# Patient Record
Sex: FEMALE
[De-identification: names, ages, dates, MRNs, and addresses within clinical notes are randomized per-mention and may not be internally consistent; named-entity substitution may affect disease eponyms.]

---

## 2018-01-25 ENCOUNTER — HOSPITAL ENCOUNTER (EMERGENCY)
Dept: HOSPITAL 5 - ED | Age: 33
LOS: 1 days | Discharge: HOME | End: 2018-01-26
Payer: MEDICAID

## 2018-01-25 DIAGNOSIS — Z3A.10: ICD-10-CM

## 2018-01-25 DIAGNOSIS — Z87.891: ICD-10-CM

## 2018-01-25 DIAGNOSIS — O03.4: Primary | ICD-10-CM

## 2018-01-25 LAB
BAND NEUTROPHILS # (MANUAL): 0.2 K/MM3
HCT VFR BLD CALC: 40.1 % (ref 30.3–42.9)
HGB BLD-MCNC: 12.9 GM/DL (ref 10.1–14.3)
MCH RBC QN AUTO: 26 PG (ref 28–32)
MCHC RBC AUTO-ENTMCNC: 32 % (ref 30–34)
MCV RBC AUTO: 79 FL (ref 79–97)
MYELOCYTES # (MANUAL): 0 K/MM3
PLATELET # BLD: 344 K/MM3 (ref 140–440)
PROMYELOCYTES # (MANUAL): 0 K/MM3
RBC # BLD AUTO: 5.07 M/MM3 (ref 3.65–5.03)
TOTAL CELLS COUNTED BLD: 100

## 2018-01-25 PROCEDURE — 76817 TRANSVAGINAL US OBSTETRIC: CPT

## 2018-01-25 PROCEDURE — 86900 BLOOD TYPING SEROLOGIC ABO: CPT

## 2018-01-25 PROCEDURE — 36415 COLL VENOUS BLD VENIPUNCTURE: CPT

## 2018-01-25 PROCEDURE — 96372 THER/PROPH/DIAG INJ SC/IM: CPT

## 2018-01-25 PROCEDURE — 99284 EMERGENCY DEPT VISIT MOD MDM: CPT

## 2018-01-25 PROCEDURE — 86850 RBC ANTIBODY SCREEN: CPT

## 2018-01-25 PROCEDURE — 85025 COMPLETE CBC W/AUTO DIFF WBC: CPT

## 2018-01-25 PROCEDURE — 84702 CHORIONIC GONADOTROPIN TEST: CPT

## 2018-01-25 PROCEDURE — 85007 BL SMEAR W/DIFF WBC COUNT: CPT

## 2018-01-25 PROCEDURE — 76801 OB US < 14 WKS SINGLE FETUS: CPT

## 2018-01-25 PROCEDURE — 86901 BLOOD TYPING SEROLOGIC RH(D): CPT

## 2018-01-25 NOTE — ULTRASOUND REPORT
FINAL REPORT



PROCEDURE:  US OB TRANSVAGINAL and transabdominal 



TECHNIQUE:  Real-time transabdominal and transvaginal sonography

of the uterus, placenta, amniotic fluid, adnexa, and fetus was

performed with image documentation. Measurements were obtained to

determine fetal age/size. M-mode Doppler was used to document

fetal heartbeat. CPT 68376 and 81738 



HISTORY:  vag bleeding and cramping 



COMPARISON:  No prior studies are available for comparison.



FINDINGS:  

Uterus measures 10.4 x 3.7 x 4.3 centimeters. 



There is a cystic ovoid structure which extends from the lower

uterine segment into the endocervical canal, which may be a

gestational sac. No fetal pole or yolk sac is seen. Mean sac

diameter measures 17.9 millimeters, which would correlate to a

gestational age of 6 weeks 5 days. 



The endometrium is irregular and heterogeneous, with complex

fluid present in the endometrial cavity. 



Right Ovary: 1.8 centimeter cyst is present



Left Ovary: Normal.



IMPRESSION:  

Anechoic structure in the lower uterine segment and endocervical

canal, which has the appearance of a gestational sac. No fetal

pole or yolk sac is seen. 



Small amount of complex fluid is seen in the endometrial cavity. 



Recommend clinical and sonographic follow-up. 



PROCEDURE:  



TECHNIQUE:  



HISTORY:  



COMPARISON:  



FINDINGS:  



IMPRESSION:

## 2018-01-25 NOTE — ULTRASOUND REPORT
FINAL REPORT



PROCEDURE:  US OB TRANSVAGINAL and transabdominal 



TECHNIQUE:  Real-time transabdominal and transvaginal sonography

of the uterus, placenta, amniotic fluid, adnexa, and fetus was

performed with image documentation. Measurements were obtained to

determine fetal age/size. M-mode Doppler was used to document

fetal heartbeat. CPT 37088 and 38410 



HISTORY:  vag bleeding and cramping 



COMPARISON:  No prior studies are available for comparison.



FINDINGS:  

Uterus measures 10.4 x 3.7 x 4.3 centimeters. 



There is a cystic ovoid structure which extends from the lower

uterine segment into the endocervical canal, which may be a

gestational sac. No fetal pole or yolk sac is seen. Mean sac

diameter measures 17.9 millimeters, which would correlate to a

gestational age of 6 weeks 5 days. 



The endometrium is irregular and heterogeneous, with complex

fluid present in the endometrial cavity. 



Right Ovary: 1.8 centimeter cyst is present



Left Ovary: Normal.



IMPRESSION:  

Anechoic structure in the lower uterine segment and endocervical

canal, which has the appearance of a gestational sac. No fetal

pole or yolk sac is seen. 



Small amount of complex fluid is seen in the endometrial cavity. 



Recommend clinical and sonographic follow-up.

## 2018-01-26 VITALS — SYSTOLIC BLOOD PRESSURE: 128 MMHG | DIASTOLIC BLOOD PRESSURE: 74 MMHG

## 2018-01-26 NOTE — EMERGENCY DEPARTMENT REPORT
ED Pregnancy HPI





- General


Chief complaint: Vaginal Bleeding


Stated complaint: VAG BLEEDING, PREG, VOMITING


Time Seen by Provider: 18 07:16


Source: patient


Mode of arrival: Ambulatory


Limitations: No Limitations





- History of Present Illness


Initial comments: 


Barbara is a pleasant 32-year-old female  last menstrual period in October 

who presents with pelvic pain and a large amount of vaginal bleeding.  

Yesterday she developed vomiting.  Her recent outpatient ultrasound showed a 

gestational sac approximately 10 weeks estimated gestational age.  The fetal 

pole measured to be approximately 6 weeks.  She denies fever.  Denies cough.  

She denies any other symptoms.  








Social history: She has history of tobacco use and alcohol use.  Her boyfriend 

is at the bedside.





MD Complaint: vaginal bleeding


-: days(s) (1)


Location: pelvis


Radiation: none


Severity: severe


Severity scale (0 -10): 10


Quality: cramping


Consistency: constant


Associated symptoms: nausea/vomiting





- Related Data


 Previous Rx's











 Medication  Instructions  Recorded  Last Taken  Type


 


HYDROcodone/APAP 5-325 [[place] 1 each PO Q4HR PRN #10 tablet MDD 18 

Unknown Rx





5/325] 8 tabs/day   


 


Ibuprofen 800 mg PO Q6HR #15 tablet 18 Unknown Rx











 Allergies











Allergy/AdvReac Type Severity Reaction Status Date / Time


 


No Known Allergies Allergy   Verified 18 20:23














ED Review of Systems


ROS: 


Stated complaint: VAG BLEEDING, PREG, VOMITING


Other details as noted in HPI





Comment: All other systems reviewed and negative


Constitutional: denies: chills, fever, malaise


ENT: denies: ear pain


Respiratory: denies: cough


Cardiovascular: denies: chest pain


Gastrointestinal: nausea, vomiting.  denies: melena


Genitourinary: denies: urgency





ED Past Medical Hx





- Past Medical History


Previous Medical History?: No





- Surgical History


Past Surgical History?: No





- Social History


Smoking Status: Former Smoker


Substance Use Type: None





- Medications


Home Medications: 


 Home Medications











 Medication  Instructions  Recorded  Confirmed  Last Taken  Type


 


HYDROcodone/APAP 5-325 [[place] 1 each PO Q4HR PRN #10 tablet MDD 18  

Unknown Rx





5/325] 8 tabs/day    


 


Ibuprofen 800 mg PO Q6HR #15 tablet 18  Unknown Rx














ED Physical Exam





- General


Limitations: No Limitations


General appearance: alert, in no apparent distress





- Head


Head exam: Present: atraumatic, normocephalic





- Eye


Eye exam: Present: normal appearance





- ENT


ENT exam: Present: normal orophraynx, mucous membranes moist





- Neck


Neck exam: Present: normal inspection





- Respiratory


Respiratory exam: Present: normal lung sounds bilaterally.  Absent: respiratory 

distress, wheezes, rales, rhonchi





- Cardiovascular


Cardiovascular Exam: Present: regular rate, normal rhythm.  Absent: normal 

heart sounds, systolic murmur, diastolic murmur, rubs, gallop





- GI/Abdominal


GI/Abdominal exam: Present: soft, normal bowel sounds.  Absent: distended, 

tenderness, guarding, rebound





- 


External exam: Present: normal external exam.  Absent: erythema, swelling, 

lesions, lacerations, ecchymosis


Speculum exam: Present: vaginal bleeding, other (mild vaginal bleeding with 

open cervical os, active extrusion of products of conception)





- Extremities Exam


Extremities exam: Present: normal inspection





- Back Exam


Back exam: Present: normal inspection





- Neurological Exam


Neurological exam: Present: alert, oriented X3





- Psychiatric


Psychiatric exam: Present: normal affect, normal mood





- Skin


Skin exam: Present: warm, dry, intact, normal color.  Absent: rash





ED Course


 Vital Signs











  18





  19:31 19:40 02:00


 


Temperature 97.8 F  98.2 F


 


Pulse Rate 65  76


 


Respiratory 14 18 12





Rate   


 


Blood Pressure 122/77  110/77


 


O2 Sat by Pulse 100  100





Oximetry   














  18





  07:23


 


Temperature 


 


Pulse Rate 


 


Respiratory 20





Rate 


 


Blood Pressure 


 


O2 Sat by Pulse 100





Oximetry 














ED Medical Decision Making





- Lab Data


Result diagrams: 


 18 20:06











 Laboratory Results - last 24 hr











  18





  20:06 20:06 20:06


 


WBC  20.6 H  


 


RBC  5.07 H  


 


Hgb  12.9  


 


Hct  40.1  


 


MCV  79  


 


MCH  26 L  


 


MCHC  32  


 


RDW  14.3  


 


Plt Count  344  


 


Add Manual Diff  Complete  


 


Total Counted  100  


 


Seg Neuts % (Manual)  89.0 H  


 


Band Neutrophils %  1.0  


 


Lymphocytes % (Manual)  6.0 L  


 


Reactive Lymphs % (Man)  0  


 


Monocytes % (Manual)  3.0  


 


Eosinophils % (Manual)  0  


 


Basophils % (Manual)  0  


 


Metamyelocytes %  1.0  


 


Myelocytes %  0  


 


Promyelocytes %  0  


 


Blast Cells %  0  


 


Nucleated RBC %  Not Reportable  


 


Seg Neutrophils # Man  18.3 H  


 


Band Neutrophils #  0.2  


 


Lymphocytes # (Manual)  1.2  


 


Abs React Lymphs (Man)  0.0  


 


Monocytes # (Manual)  0.6  


 


Eosinophils # (Manual)  0.0  


 


Basophils # (Manual)  0.0  


 


Metamyelocytes #  0.2  


 


Myelocytes #  0.0  


 


Promyelocytes #  0.0  


 


Blast Cells #  0.0  


 


WBC Morphology  Not Reportable  


 


Hypersegmented Neuts  Not Reportable  


 


Hyposegmented Neuts  Not Reportable  


 


Hypogranular Neuts  Not Reportable  


 


Smudge Cells  Not Reportable  


 


Toxic Granulation  Not Reportable  


 


Toxic Vacuolation  Not Reportable  


 


Dohle Bodies  Not Reportable  


 


Pelger-Huet Anomaly  Not Reportable  


 


Elnea Rods  Not Reportable  


 


Platelet Estimate  Consistent w auto  


 


Clumped Platelets  Not Reportable  


 


Plt Clumps, EDTA  Not Reportable  


 


Large Platelets  Not Reportable  


 


Giant Platelets  Not Reportable  


 


Platelet Satelliting  Not Reportable  


 


Plt Morphology Comment  Not Reportable  


 


RBC Morphology  Not Reportable  


 


Dimorphic RBCs  Not Reportable  


 


Polychromasia  Not Reportable  


 


Hypochromasia  Not Reportable  


 


Poikilocytosis  Not Reportable  


 


Anisocytosis  Not Reportable  


 


Microcytosis  Not Reportable  


 


Macrocytosis  Not Reportable  


 


Spherocytes  Not Reportable  


 


Pappenheimer Bodies  Not Reportable  


 


Sickle Cells  Not Reportable  


 


Target Cells  Not Reportable  


 


Tear Drop Cells  Not Reportable  


 


Ovalocytes  Few  


 


Helmet Cells  Not Reportable  


 


[place] Bodies  Not Reportable  


 


[place] Rings  Not Reportable  


 


[place] Cells  Not Reportable  


 


Bite Cells  Not Reportable  


 


Crenated Cell  Not Reportable  


 


Elliptocytes  Not Reportable  


 


Acanthocytes (Spur)  Not Reportable  


 


Rouleaux  Not Reportable  


 


Hemoglobin C Crystals  Not Reportable  


 


Schistocytes  Not Reportable  


 


Malaria parasites  Not Reportable  


 


Jay Bodies  Not Reportable  


 


Hem Pathologist Commnt  No  


 


HCG, Quant   6235 H 


 


Blood Type    O POSITIVE


 


Antibody Screen    Negative














 Vital Signs











  18





  19:31 19:40 02:00


 


Temperature 97.8 F  98.2 F


 


Pulse Rate 65  76


 


Respiratory 14 18 12





Rate   


 


Blood Pressure 122/77  110/77


 


O2 Sat by Pulse 100  100





Oximetry   














  18





  07:23


 


Temperature 


 


Pulse Rate 


 


Respiratory 20





Rate 


 


Blood Pressure 


 


O2 Sat by Pulse 100





Oximetry 














- Medical Decision Making


Unfortunately Ms. Hassan is experiencing incomplete .  Bleeding is 

mild.  I observed the pad that she removed.  She only had mild vaginal bleeding 

within the last 2 hours.  I was able to extract some products of conception.  

She still had open os with small amount of bleeding.  She understands that she'

ll continue to expel products.  She understands that mild to moderate vaginal 

bleeding is to be expected.  She received analgesia in the ED.  She was 

prescribed Norco and ibuprofen.  She has follow-up with [facility] 

next week.





Blood type O pos





Critical care attestation.: 


If time is entered above; I have spent that time in minutes in the direct care 

of this critically ill patient, excluding procedure time.








ED Disposition


Clinical Impression: 


 Incomplete 





Disposition: DC-01 TO HOME OR SELFCARE


Is pt being admited?: No


Does the pt Need Aspirin: No


Condition: Stable


Instructions:  Spontaneous Miscarriage (ED)


Prescriptions: 


HYDROcodone/APAP 5-325 [[place] 5/325] 1 each PO Q4HR PRN #10 tablet MDD 8 tabs/

day


 PRN Reason: Pain


Ibuprofen 800 mg PO Q6HR #15 tablet